# Patient Record
Sex: FEMALE | Race: WHITE
[De-identification: names, ages, dates, MRNs, and addresses within clinical notes are randomized per-mention and may not be internally consistent; named-entity substitution may affect disease eponyms.]

---

## 2020-03-16 ENCOUNTER — HOSPITAL ENCOUNTER (EMERGENCY)
Dept: HOSPITAL 56 - MW.ED | Age: 35
Discharge: HOME | End: 2020-03-16
Payer: COMMERCIAL

## 2020-03-16 DIAGNOSIS — B34.9: Primary | ICD-10-CM

## 2020-03-16 NOTE — EDM.PDOC
ED HPI GENERAL MEDICAL PROBLEM





- General


Chief Complaint: Respiratory Problem


Stated Complaint: SORE THROAT, RUNNING NOSE, COUGHING


Time Seen by Provider: 03/16/20 11:06


Source of Information: Reports: Patient


History Limitations: Reports: No Limitations





- History of Present Illness


INITIAL COMMENTS - FREE TEXT/NARRATIVE: 


HISTORY AND PHYSICAL:





History of present illness:


Patient is a 34-year-old female who presents to the ED today for concern of 

sore throat, nasal congestion/runny nose, and cough over the past several days.

  Patient states she did travel to [place] at the beginning of the month but 

just got sick over the past several days.  Patient states that her biggest 

symptom has been the runny stuffy nose with sore throat.  Patient denies any 

health history or any other symptoms or concerns.





Patient denies fever, chills, chest pain, shortness of breath. Denies headache, 

neck stiff ness, change in vision, syncope, or near syncope. Denies nausea, 

vomiting, abdominal pain, diarrhea, constipation, or dysuria. Has not noted any 

blood in urine or stool. Patient has been eating and drinking appropriately.





Review of systems: 


As per history of present illness and below otherwise all systems reviewed and 

negative.





Past medical history: 


As per history of present illness and as reviewed below otherwise 

noncontributory.





Surgical history: 


As per history of present illness and as reviewed below otherwise 

noncontributory.





Social history: 


See social history for further information





Family history: 


As per history of present illness and as reviewed below otherwise 

noncontributory.





Physical exam:


General: Patient is alert, oriented, and in no acute distress. Patient sitting 

comfortably on exam table.


HEENT: Atraumatic, normocephalic, pupils equal and reactive bilaterally, 

negative for conjunctival pallor or scleral icterus, mucous membranes moist, 

TMs normal bilaterally, throat clear, neck supple, nontender, trachea midline. 

No drooling or trismus noted. No meningeal signs. No hot potato voice noted.  

Bilateral nasal congestion.


Lungs: Clear to auscultation, breath sounds equal bilaterally, chest nontender.


Heart: S1S2, regular rate and rhythm without overt murmur


Abdomen: Soft, nondistended, nontender. Negative for masses or 

hepatosplenomegaly. Negative for costovertebral tenderness.


Pelvis: Stable nontender.


Genitourinary: Deferred.


Rectal: Deferred.


Skin: Intact, warm, dry. No lesions or rashes noted.


Extremities: Atraumatic, negative for cords or calf pain. Neurovascular 

unremarkable.


Neuro: Awake, alert, oriented. Cranial nerves II through XII unremarkable. 

Cerebellum unremarkable. Motor and sensory unremarkable throughout. Exam 

nonfocal.





Notes:


Patient does express concern for the COVID-19 virus at this time.  However, she 

has not had any fevers and is expressing more upper respiratory viral symptoms 

with runny stuffy nose and sore throat.  Symptoms are not consistent with the 

COVID-19 virus and do not see necessary for testing at this time.


Discussed importance for follow-up with a primary care provider.


Voices understanding and is agreeable to plan of care. Denies any further 

questions or concerns at this time.





Diagnostics:


Influenza, strep





Therapeutics:


None





Prescription:


None





Impression: 


Viral syndrome





Plan:


1.  You can alternate ibuprofen and Tylenol as directed for pain and discomfort.


2.  Follow-up with a primary care provider as discussed.  Return to the ED as 

needed and as discussed.





Definitive disposition and diagnosis as appropriate pending reevaluation and 

review of above.








- Related Data


 Allergies











Allergy/AdvReac Type Severity Reaction Status Date / Time


 


No Known Allergies Allergy   Verified 03/16/20 10:30











Home Meds: 


 Home Meds





. [No Known Home Meds]  03/16/20 [History]











Past Medical History





- Past Health History


Medical/Surgical History: Denies Medical/Surgical History





- Infectious Disease History


Infectious Disease History: Reports: Chicken Pox





Social & Family History





- Tobacco Use


Smoking Status *Q: Never Smoker





- Recreational Drug Use


Recreational Drug Use: No





ED ROS GENERAL





- Review of Systems


Review Of Systems: Comprehensive ROS is negative, except as noted in HPI.





ED EXAM, GENERAL





- Physical Exam


Exam: See Below (see dictation)





Course





- Vital Signs


Last Recorded V/S: 





 Last Vital Signs











Temp  98.2 F   03/16/20 10:29


 


Pulse  96   03/16/20 10:29


 


Resp  16   03/16/20 10:29


 


BP  119/89   03/16/20 10:29


 


Pulse Ox  98   03/16/20 10:29














- Orders/Labs/Meds


Orders: 





 Active Orders 24 hr











 Category Date Time Status


 


 CULTURE STREP A CONFIRMATION [RM] Stat Lab  03/16/20 10:03 Results


 


 STREP SCRN A RAPID W CULT CONF [RM] Stat Lab  03/16/20 10:03 Results


 


 Isolation [COMM] Routine Oth  03/16/20 10:09 Active














Departure





- Departure


Time of Disposition: 11:25


Disposition: Home, Self-Care 01


Clinical Impression: 


 Viral syndrome








- Discharge Information


Referrals: 


PCP,None [Primary Care Provider] - 


Additional Instructions: 


The following information is given to patients seen in the emergency department 

who are being discharged to home. This information is to outline your options 

for follow-up care. We provide all patients seen in our emergency department 

with a follow-up referral.





The need for follow-up, as well as the timing and circumstances, are variable 

depending upon the specifics of your emergency department visit.





If you don't have a primary care physician on staff, we will provide you with a 

referral. We always advise you to contact your personal physician following an 

emergency department visit to inform them of the circumstance of the visit and 

for follow-up with them and/or the need for any referrals to a consulting 

specialist.





The emergency department will also refer you to a specialist when appropriate. 

This referral assures that you have the opportunity for follow-up care with a 

specialist. All of these measure are taken in an effort to provide you with 

optimal care, which includes your follow-up.





Under all circumstances we always encourage you to contact your private 

physician who remains a resource for coordinating your care. When calling for 

follow-up care, please make the office aware that this follow-up is from your 

recent emergency room visit. If for any reason you are refused follow-up, 

please contact the [facility] Emergency 

Department at [phone] and asked to speak to the emergency department 

charge nurse.





[facility]


Primary Care


[address] [place] [id number]


Phone: [phone]


Fax: [phone]





[address] [address]


Phone: [phone]


Fax: [phone]





1.  You can alternate ibuprofen and Tylenol as directed for pain and discomfort.


2.  Follow-up with a primary care provider as discussed.  Return to the ED as 

needed and as discussed.





 











Sepsis Event Note





- Evaluation


Sepsis Screening Result: No Definite Risk





- Focused Exam


Vital Signs: 





 Vital Signs











  Temp Pulse Resp BP Pulse Ox


 


 03/16/20 10:29  98.2 F  96  16  119/89  98











Date Exam was Performed: 03/16/20


Time Exam was Performed: 11:25





- My Orders


Last 24 Hours: 





My Active Orders





03/16/20 10:03


CULTURE STREP A CONFIRMATION [RM] Stat 


STREP SCRN A RAPID W CULT CONF [RM] Stat 





03/16/20 10:09


Isolation [COMM] Routine 














- Assessment/Plan


Last 24 Hours: 





My Active Orders





03/16/20 10:03


CULTURE STREP A CONFIRMATION [RM] Stat 


STREP SCRN A RAPID W CULT CONF [RM] Stat 





03/16/20 10:09


Isolation [COMM] Routine